# Patient Record
Sex: FEMALE | Race: AMERICAN INDIAN OR ALASKA NATIVE
[De-identification: names, ages, dates, MRNs, and addresses within clinical notes are randomized per-mention and may not be internally consistent; named-entity substitution may affect disease eponyms.]

---

## 2017-06-17 NOTE — EMERGENCY DEPARTMENT REPORT
ED General Adult HPI





- General


Chief complaint: Nausea/Vomiting/Diarrhea


Stated complaint: SEIZURES X 3 TODAY/PAIN


Time Seen by Provider: 06/17/17 10:58


Source: patient


Mode of arrival: Wheelchair


Limitations: No Limitations





- History of Present Illness


Initial comments: 


Apparently the patient has been having shaking movements while awake 

periodically during the night and then again upon arrival in the hospital.  Her 

friend presume that these movements were seizure activity.  The patient herself 

admits that she was staying over a friend's house and did not take her usual 

medicines which I believe includes opiates and benzodiazepines.  She states 

that she has a history of colon cancer and Crohn's.  Ordinarily she receives 

her care at [facility].  I understand that she may be a frequent visitor.  She 

states that she gets primary care at the [facility].  The patient did not 

injure herself during these shaking movements.  However, she does admit to 

chronic abdominal and back pain.  She did not bite her tongue.  She was not 

incontinent of urine.  She does have a colostomy bag.





The patient initially refused blood work.  She then stated that she would 

urinate.  However she never provided a urine specimen.  I told her she could be 

catheterized but she refused that as well.


-: Gradual


Location: back, abdomen (chronic pain)


Consistency: intermittent


Improves with: none


Worsens with: none


Associated Symptoms: denies other symptoms


Treatments Prior to Arrival: none





- Related Data


 Previous Rx's











 Medication  Instructions  Recorded  Last Taken  Type


 


HYDROcodone/APAP 5-325 [[place] 1 each PO Q6HR PRN #7 tablet 06/17/17 Unknown Rx





5/325]    


 


LORazepam [Ativan] 0.5 mg PO Q12H PRN #7 tablet 06/17/17 Unknown Rx











 Allergies











Allergy/AdvReac Type Severity Reaction Status Date / Time


 


hydromorphone Allergy  Hives Verified 06/17/17 08:31


 


tramadol Allergy  Hives Verified 06/17/17 08:31














ED Review of Systems


ROS: 


Stated complaint: SEIZURES X 3 TODAY/PAIN


Other details as noted in HPI





Constitutional: denies: chills, fever


Eyes: denies: eye pain, eye discharge, vision change


ENT: denies: ear pain, throat pain


Respiratory: denies: cough, shortness of breath, wheezing


Cardiovascular: denies: chest pain, palpitations


Endocrine: no symptoms reported


Gastrointestinal: abdominal pain.  denies: nausea, diarrhea


Genitourinary: denies: urgency, dysuria, discharge


Musculoskeletal: back pain.  denies: joint swelling, arthralgia


Skin: denies: rash, lesions


Neurological: denies: headache, weakness, paresthesias


Psychiatric: denies: anxiety, depression


Hematological/Lymphatic: denies: easy bleeding, easy bruising





ED Past Medical Hx





- Past Medical History


Previous Medical History?: Yes


Hx Seizures: Yes


Additional medical history: Crohn's disease, colon cancer, infected port, 

infected ureteral stent, Escherichia coli sepsis.  This is what I could 

ascertain from the patient.





- Surgical History


Additional Surgical History: COLOSTOMY





- Social History


Smoking Status: Never Smoker


Substance Use Type: None





- Medications


Home Medications: 


 Home Medications











 Medication  Instructions  Recorded  Confirmed  Last Taken  Type


 


HYDROcodone/APAP 5-325 [[place] 1 each PO Q6HR PRN #7 tablet 06/17/17  Unknown Rx





5/325]     


 


LORazepam [Ativan] 0.5 mg PO Q12H PRN #7 tablet 06/17/17  Unknown Rx














ED Physical Exam





- General


Limitations: No Limitations


General appearance: alert, in no apparent distress, anxious





- Head


Head exam: Present: atraumatic, normocephalic





- Eye


Eye exam: Present: normal appearance.  Absent: scleral icterus





- ENT


ENT exam: Present: normal exam, mucous membranes moist





- Neck


Neck exam: Present: normal inspection.  Absent: tenderness, meningismus





- Respiratory


Respiratory exam: Present: normal lung sounds bilaterally.  Absent: respiratory 

distress





- Cardiovascular


Cardiovascular Exam: Present: regular rate, normal rhythm.  Absent: systolic 

murmur, diastolic murmur, rubs, gallop





- GI/Abdominal


GI/Abdominal exam: Present: soft, normal bowel sounds, other (ostomy noted 

without signs of infection).  Absent: distended, tenderness, guarding, rebound, 

rigid





- Extremities Exam


Extremities exam: Present: normal inspection





- Back Exam


Back exam: Present: normal inspection





- Neurological Exam


Neurological exam: Present: alert, oriented X3, CN II-XII intact.  Absent: 

motor sensory deficit





- Psychiatric


Psychiatric exam: Present: normal affect, normal mood





- Skin


Skin exam: Present: warm, dry, intact, normal color.  Absent: rash





ED Course





 Vital Signs











  06/17/17





  08:31


 


Temperature 98.5 F


 


Pulse Rate 93 H


 


Respiratory 18





Rate 


 


Blood Pressure 113/81


 


O2 Sat by Pulse 100





Oximetry 














- Reevaluation(s)


Reevaluation #1: 


Patient did have some erratic slightly agitated behavior here.  After she was 

given a benzodiazepine and opiate by mouth him of this behavior resolved.  I 

suspect the patient was having withdrawal.  She will be given a small quantity 

of these medicines.  He states that she feels fine.  She has never had any 

urinary symptoms so I won't force her to sign out AMA without urinalysis.  She 

is appropriate for outpatient follow-up with her primary care provider at the 

[facility].


06/17/17 12:18








ED Medical Decision Making





- Lab Data


Result diagrams: 


 06/17/17 10:53





 06/17/17 10:53


Critical care attestation.: 


If time is entered above; I have spent that time in minutes in the direct care 

of this critically ill patient, excluding procedure time.








ED Disposition


Clinical Impression: 


Withdrawal syndrome


Qualifiers:


 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 

Sedative, hypnotic or anxiolytic dependence with withdrawal, unspecified





Abdominal pain


Qualifiers:


 Abdominal location: generalized Qualified Code(s): R10.84 - Generalized 

abdominal pain





Crohn's disease


Qualifiers:


 Gastrointestinal tract location: unspecified location Digestive disease 

complication type: unspecified complication Qualified Code(s): K50.919 - Crohn'

s disease, unspecified, with unspecified complications





Disposition: DC-01 TO HOME OR SELFCARE


Is pt being admited?: No


Does the pt Need Aspirin: No


Condition: Stable


Instructions:  Abdominal Pain (ED)


Additional Instructions: 


Return any acute change as needed.  Follow-up with her primary care provider on 

Monday.


Prescriptions: 


HYDROcodone/APAP 5-325 [[place] 5/325] 1 each PO Q6HR PRN #7 tablet


 PRN Reason: Pain


LORazepam [Ativan] 0.5 mg PO Q12H PRN #7 tablet


 PRN Reason: Anxiety


Referrals: 


JAMIE SALDIVAR MD [Primary Care Provider] - 24 Hours


Time of Disposition: 12:20

## 2020-02-03 ENCOUNTER — HOSPITAL ENCOUNTER (EMERGENCY)
Dept: HOSPITAL 5 - ED | Age: 30
Discharge: HOME | End: 2020-02-03
Payer: MEDICAID

## 2020-02-03 VITALS — SYSTOLIC BLOOD PRESSURE: 110 MMHG | DIASTOLIC BLOOD PRESSURE: 70 MMHG
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
## 2020-02-03 DIAGNOSIS — S39.012A: Primary | ICD-10-CM

## 2020-02-03 DIAGNOSIS — R11.2: ICD-10-CM
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
## 2020-02-03 DIAGNOSIS — Z98.890: ICD-10-CM
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
## 2020-02-03 DIAGNOSIS — Z88.6: ICD-10-CM
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
## 2020-02-03 DIAGNOSIS — K50.90: ICD-10-CM

## 2020-02-03 DIAGNOSIS — R10.84: ICD-10-CM

## 2020-02-03 DIAGNOSIS — Z79.899: ICD-10-CM

## 2020-02-03 LAB
ALBUMIN SERPL-MCNC: 5 G/DL (ref 3.9–5)
ALT SERPL-CCNC: 16 UNITS/L (ref 7–56)
BACTERIA #/AREA URNS HPF: (no result) /HPF
BASOPHILS # (AUTO): 0.1 K/MM3 (ref 0–0.1)
BASOPHILS NFR BLD AUTO: 0.8 % (ref 0–1.8)
BILIRUB UR QL STRIP: (no result)
BLOOD UR QL VISUAL: (no result)
BUN SERPL-MCNC: 14 MG/DL (ref 7–17)
BUN/CREAT SERPL: 16 %
CALCIUM SERPL-MCNC: 10 MG/DL (ref 8.4–10.2)
EOSINOPHIL # BLD AUTO: 0.1 K/MM3 (ref 0–0.4)
EOSINOPHIL NFR BLD AUTO: 0.8 % (ref 0–4.3)
HCT VFR BLD CALC: 40.8 % (ref 30.3–42.9)
HEMOLYSIS INDEX: 19
HGB BLD-MCNC: 12.9 GM/DL (ref 10.1–14.3)
LYMPHOCYTES # BLD AUTO: 1.6 K/MM3 (ref 1.2–5.4)
LYMPHOCYTES NFR BLD AUTO: 21.7 % (ref 13.4–35)
MCHC RBC AUTO-ENTMCNC: 32 % (ref 30–34)
MCV RBC AUTO: 91 FL (ref 79–97)
MONOCYTES # (AUTO): 0.7 K/MM3 (ref 0–0.8)
MONOCYTES % (AUTO): 9.4 % (ref 0–7.3)
MUCOUS THREADS #/AREA URNS HPF: (no result) /HPF
PH UR STRIP: 6 [PH] (ref 5–7)
PLATELET # BLD: 330 K/MM3 (ref 140–440)
RBC # BLD AUTO: 4.47 M/MM3 (ref 3.65–5.03)
RBC #/AREA URNS HPF: 6 /HPF (ref 0–6)
UROBILINOGEN UR-MCNC: < 2 MG/DL (ref ?–2)
WBC #/AREA URNS HPF: 45 /HPF (ref 0–6)

## 2020-02-03 PROCEDURE — 36415 COLL VENOUS BLD VENIPUNCTURE: CPT

## 2020-02-03 PROCEDURE — 81001 URINALYSIS AUTO W/SCOPE: CPT

## 2020-02-03 PROCEDURE — 96375 TX/PRO/DX INJ NEW DRUG ADDON: CPT

## 2020-02-03 PROCEDURE — 87076 CULTURE ANAEROBE IDENT EACH: CPT

## 2020-02-03 PROCEDURE — 99285 EMERGENCY DEPT VISIT HI MDM: CPT

## 2020-02-03 PROCEDURE — 85025 COMPLETE CBC W/AUTO DIFF WBC: CPT

## 2020-02-03 PROCEDURE — 80053 COMPREHEN METABOLIC PANEL: CPT

## 2020-02-03 PROCEDURE — 72100 X-RAY EXAM L-S SPINE 2/3 VWS: CPT

## 2020-02-03 PROCEDURE — 74176 CT ABD & PELVIS W/O CONTRAST: CPT

## 2020-02-03 PROCEDURE — 81025 URINE PREGNANCY TEST: CPT

## 2020-02-03 PROCEDURE — 83690 ASSAY OF LIPASE: CPT

## 2020-02-03 PROCEDURE — 87086 URINE CULTURE/COLONY COUNT: CPT

## 2020-02-03 PROCEDURE — 96361 HYDRATE IV INFUSION ADD-ON: CPT

## 2020-02-03 PROCEDURE — 87186 SC STD MICRODIL/AGAR DIL: CPT

## 2020-02-03 PROCEDURE — 96374 THER/PROPH/DIAG INJ IV PUSH: CPT

## 2020-02-03 NOTE — EMERGENCY DEPARTMENT REPORT
ED Abdominal Pain HPI





- General


Chief Complaint: Abdominal Pain


Stated Complaint: (L) LUMBAR PAIN FROM FALL


Time Seen by Provider: 02/03/20 13:10


Source: patient


Mode of arrival: Ambulatory


Limitations: No Limitations





- History of Present Illness


Initial Comments: 





This is a 29-year-old female with past medical history of Crohn's disease, 

ileostomy, colon cancer, nontoxic, well nourished in appearance, no acute signs 

of distress presents to the ED with c/o of abdominal pain and nausea vomiting.  

Patient also has a secondary complaint of lower back pain after a trip and fall 

ground-level.  Patient denies any radiation of pain.  Denies any bladder or 

bowel instability.  Patient denies any urinary symptoms. Patient describes 

vomiting as food content and yellow gastric acid.  Patient describes abdominal 

pain as cramping and aching with level of 8/10 diffuse.  Patient denies chest 

pain, short of breath, fever, chills, headache, stiff neck, numbness or 

tingling. Patient denies any recent travels.  Patient stated allergies to 

tramadol and hydromorphone but denies allergies to morphine.


MD Complaint: abdominal pain


-: days(s)


Location: diffuse


Radiation: none


Migration to: no migration


Severity: moderate


Severity scale (0 -10): 8


Quality: cramping, aching


Consistency: constant


Improves With: nothing


Worsens With: nothing


Associated Symptoms: nausea, vomiting.  denies: diarrhea, fever, chills, 

constipation, dysuria, hematemesis, hematochezia, melena, hematuria, anorexia, 

syncope





- Related Data


                                  Previous Rx's











 Medication  Instructions  Recorded  Last Taken  Type


 


HYDROcodone/APAP 5-325 [[place] 1 each PO Q6HR PRN #7 tablet 06/17/17 Unknown Rx





5/325]    


 


LORazepam [Ativan] 0.5 mg PO Q12H PRN #7 tablet 06/17/17 Unknown Rx


 


Ondansetron [Zofran Odt] 4 mg PO Q8HR PRN #20 tab.rapdis 02/03/20 Unknown Rx











                                    Allergies











Allergy/AdvReac Type Severity Reaction Status Date / Time


 


hydromorphone Allergy  Hives Verified 06/17/17 08:31


 


tramadol Allergy  Hives Verified 06/17/17 08:31














ED Review of Systems


ROS: 


Stated complaint: (L) LUMBAR PAIN FROM FALL


Other details as noted in HPI





Constitutional: denies: chills, fever


Eyes: denies: eye pain, eye discharge, vision change


ENT: denies: ear pain, throat pain


Respiratory: denies: cough, shortness of breath, wheezing


Cardiovascular: denies: chest pain, palpitations


Endocrine: no symptoms reported


Gastrointestinal: abdominal pain, nausea, vomiting.  denies: diarrhea


Genitourinary: denies: urgency, dysuria, discharge


Musculoskeletal: back pain.  denies: joint swelling, arthralgia


Skin: denies: rash, lesions


Neurological: denies: headache, weakness, paresthesias


Psychiatric: denies: anxiety, depression


Hematological/Lymphatic: denies: easy bleeding, easy bruising





ED Past Medical Hx





- Past Medical History


Previous Medical History?: Yes


Hx Seizures: Yes


Additional medical history: Crohn's disease, colon cancer, infected port, infe

cted ureteral stent, Escherichia coli sepsis.  This is what I could ascertain 

from the patient.





- Surgical History


Past Surgical History?: Yes


Additional Surgical History: COLOSTOMY





- Social History


Smoking Status: Never Smoker


Substance Use Type: None





- Medications


Home Medications: 


                                Home Medications











 Medication  Instructions  Recorded  Confirmed  Last Taken  Type


 


HYDROcodone/APAP 5-325 [[place] 1 each PO Q6HR PRN #7 tablet 06/17/17  Unknown Rx





5/325]     


 


LORazepam [Ativan] 0.5 mg PO Q12H PRN #7 tablet 06/17/17  Unknown Rx


 


Ondansetron [Zofran Odt] 4 mg PO Q8HR PRN #20 tab.rapdis 02/03/20  Unknown Rx














ED Physical Exam





- General


Limitations: No Limitations


General appearance: alert, in no apparent distress





- Head


Head exam: Present: atraumatic, normocephalic





- Eye


Eye exam: Present: normal appearance





- Neck


Neck exam: Present: normal inspection, full ROM.  Absent: tenderness, 

meningismus, lymphadenopathy





- Respiratory


Respiratory exam: Present: normal lung sounds bilaterally.  Absent: respiratory 

distress, wheezes, rales, rhonchi, stridor, chest wall tenderness, accessory 

muscle use, decreased breath sounds, prolonged expiratory





- Cardiovascular


Cardiovascular Exam: Present: regular rate, normal rhythm, tachycardia, normal 

heart sounds.  Absent: irregular rhythm, systolic murmur, diastolic murmur, 

rubs, gallop





- GI/Abdominal


GI/Abdominal exam: Present: soft, tenderness (diffuse), normal bowel sounds.  

Absent: distended, guarding, rebound, rigid, diminished bowel sounds





- Extremities Exam


Extremities exam: Present: normal inspection, full ROM





- Back Exam


Back exam: Present: normal inspection, full ROM, paraspinal tenderness (lumbar 

paraspinal).  Absent: tenderness, CVA tenderness (R), CVA tenderness (L), muscle

spasm, vertebral tenderness, rash noted





- Expanded Back Exam


  ** Expanded


Back exam: Absent: saddle anesthesia


Back exam: Negative Straight Leg Raising: Left, Right





- Neurological Exam


Neurological exam: Present: alert, oriented X3, normal gait





- Psychiatric


Psychiatric exam: Present: normal affect, normal mood





- Skin


Skin exam: Present: warm, dry, intact, normal color.  Absent: rash





ED Course


                                   Vital Signs











  02/03/20 02/03/20





  09:20 15:35


 


Temperature 98.1 F 


 


Pulse Rate 111 H 109 H


 


Respiratory 18 18





Rate  


 


Blood Pressure 108/66 


 


Blood Pressure  112/85





[Left]  


 


O2 Sat by Pulse 100 100





Oximetry  














- Reevaluation(s)


Reevaluation #1: 





02/03/20 15:22


Patient is speaking in full sentences with no signs of distress noted.





ED Medical Decision Making





- Lab Data


Result diagrams: 


                                 02/03/20 14:22





                                 02/03/20 14:22





- Medical Decision Making





This is a 29-year-old male that presents with abdominal pain, n/v, and low back 

strain.  Patient is stable and was examined by me. There is no spinal t

enderness.  There is no cauda equina syndrome during examination.  No bladder or

 bowel instability. There is no abdominal tenderness. Negative signs of symptoms

 of appendicitis.  Labs obtained. UA obtained. CT of abdomen and xrays of lumbar

 spine obtained and dictated by the radiologist. Patient is notified of the 

report with no questions noted by the patient. Vital signs are stable prior to 

discharge.  Patient received medical treatment in the ED which patient stated 

symptoms has resovled and subsided.  Was instructed note to operate any 

machinery due to possible drowsiness and stated someone will drive the patient 

home. A by mouth challenge has been obtained and patient tolerated well with no 

nausea vomiting. Patient was also instructed to Follow-up with a primary care 

and gastroenterologist doctor in 3-5 days or if symptoms worsen and continue 

return to emergency room as soon as possible.  At time of discharge, the patient

 does not seem toxic or ill in appearance.  No acute signs of distress noted.  

Patient agrees to discharge treatment plan of care.  No further questions noted 

by the patient.





Patient has percocet pain medications at home.


Critical care attestation.: 


If time is entered above; I have spent that time in minutes in the direct care 

of this critically ill patient, excluding procedure time.








ED Disposition


Clinical Impression: 


Abdominal pain


Qualifiers:


 Abdominal location: generalized Qualified Code(s): R10.84 - Generalized 

abdominal pain





Nausea & vomiting


Qualifiers:


 Vomiting type: unspecified Vomiting Intractability: non-intractable Qualified 

Code(s): R11.2 - Nausea with vomiting, unspecified





Low back strain


Qualifiers:


 Encounter type: initial encounter Qualified Code(s): S39.012A - Strain of 

muscle, fascia and tendon of lower back, initial encounter





Disposition: DC-01 TO HOME OR SELFCARE


Is pt being admited?: No


Does the pt Need Aspirin: No


Condition: Stable


Instructions:  Abdominal Pain (ED)


Additional Instructions: 


Follow-up with a primary care and gastroenterologist doctor in 3-5 days or if 

symptoms worsen and continue return to emergency room as soon as possible.


Prescriptions: 


Ondansetron [Zofran Odt] 4 mg PO Q8HR PRN #20 tab.rapdis


 PRN Reason: Nausea


Referrals: 


PRIMARY CARE,MD [Primary Care Provider] - 3-5 Days


LIBORIO OLIVEIRA MD [Staff Physician] - 3-5 Days


[facility] [Outside] - 3-5 Days


[facility] [Outside] - 3-5 Days


Forms:  Work/School Release Form(ED)

## 2020-02-03 NOTE — CAT SCAN REPORT
CT abdomen pelvis wo con



INDICATION:  abd ain.



TECHNIQUE:

All CT scans at this location are performed using the following dose modulation technique: Automated 
exposure control.



CONTRAST:  None.



COMPARISON: None available.



CT ABDOMEN: Evaluation the parenchymal organs demonstrates a 1.3 cm cyst at the left hepatic lobe. Th
ere are 2 high density lesions at the right kidney with the larger being more centrally located measu
ring 9 mm. The remaining parenchymal organs are unremarkable.



Negative for abdominal mass, fluid or inflammation. The bowel is not dilated or thickened. Postsurgic
al changes are noted at the right lower quadrant.



CT PELVIS: A right lower quadrant ostomy is noted. Negative for definitive pelvic mass. Low density w
ithin the posterior pelvis is not well evaluated but likely fluid.



IMPRESSION:

1. Negative for obstruction or localized inflammation.

2. Probable fluid posterior pelvis. This is not well evaluated due to the absence of contrast materia
l possibly fat.

3. High density lesions right kidney are likely hemorrhagic cysts. 



Signer Name: Lamont Cheung MD 

Signed: 2/3/2020 5:32 PM

 Workstation Name: [email or web address] [id number] [id number]

## 2020-02-03 NOTE — XRAY REPORT
LUMBAR SPINE 2 VIEWS



INDICATION / CLINICAL INFORMATION:

MAIN: low back pain s/p fall; Pt. fell using hoverboard in her living room this a.m.  Pt. states she 
hit a coffee table.  Pt. c/o left sided pain.  No LOC.  Pt. took a Percocet at home. 



COMPARISON:

None available.

 

FINDINGS:



BONES / JOINT(S): No acute fracture or subluxation. No significant arthritis.

SOFT TISSUES: No significant abnormality.



ADDITIONAL FINDINGS: None.







Signer Name: Lamont Cheung MD 

Signed: 2/3/2020 5:21 PM

 Workstation Name: [id number]